# Patient Record
Sex: MALE | Race: WHITE | Employment: FULL TIME | URBAN - METROPOLITAN AREA
[De-identification: names, ages, dates, MRNs, and addresses within clinical notes are randomized per-mention and may not be internally consistent; named-entity substitution may affect disease eponyms.]

---

## 2022-02-24 NOTE — TELEPHONE ENCOUNTER
DIAGNOSIS: RIGHT foot, bone spur   APPOINTMENT DATE: 03/04/2022   NOTES STATUS DETAILS   OFFICE NOTE from referring provider Care Everywhere 09/14/2021 Dr Rodas [facility]   OFFICE NOTE from other specialist N/A    DISCHARGE SUMMARY from hospital N/A    DISCHARGE REPORT from the ER N/A    OPERATIVE REPORT N/A    EMG report N/A    MEDICATION LIST N/A    MRI N/A    DEXA (osteoporosis/bone health) N/A    CT SCAN N/A    XRAYS (IMAGES & REPORTS) Received 09/14/2021 RT ft          Action    Action Taken Sent request to [facility] for 09/14/2021 xray image into PACS. Sheryl Hermosillo on 2/24/2022 at 10:50 AM     Image in PACS.  Sheryl Hemrosillo on 2/25/2022 at 10:31 AM

## 2022-03-04 ENCOUNTER — PRE VISIT (OUTPATIENT)
Dept: PODIATRY | Facility: CLINIC | Age: 52
End: 2022-03-04

## 2022-03-04 ENCOUNTER — OFFICE VISIT (OUTPATIENT)
Dept: PODIATRY | Facility: CLINIC | Age: 52
End: 2022-03-04
Payer: COMMERCIAL

## 2022-03-04 DIAGNOSIS — M20.5X1 HALLUX LIMITUS OF RIGHT FOOT: Primary | ICD-10-CM

## 2022-03-04 PROCEDURE — 99202 OFFICE O/P NEW SF 15 MIN: CPT | Performed by: PODIATRIST

## 2022-03-04 NOTE — LETTER
3/4/2022         RE: Kristian Duncan  [address]  [place] [address]        Dear Colleague,    Thank you for referring your patient, Kristian Duncan, to the [facility]. Please see a copy of my visit note below.    No past medical history on file.  There is no problem list on file for this patient.    No past surgical history on file.  Social History     Socioeconomic History     Marital status:      Spouse name: Not on file     Number of children: Not on file     Years of education: Not on file     Highest education level: Not on file   Occupational History     Not on file   Tobacco Use     Smoking status: Not on file     Smokeless tobacco: Not on file   Substance and Sexual Activity     Alcohol use: Not on file     Drug use: Not on file     Sexual activity: Not on file   Other Topics Concern     Not on file   Social History Narrative     Not on file     Social Determinants of Health     Financial Resource Strain: Not on file   Food Insecurity: Not on file   Transportation Needs: Not on file   Physical Activity: Not on file   Stress: Not on file   Social Connections: Not on file   Intimate Partner Violence: Not on file   Housing Stability: Not on file     No family history on file.        SUBJECTIVE FINDINGS:  A 51-year-old male presents for right 1st MPJ pain.  He relates if he walks a lot or pushes off hard and he cannot do any running.  He says it hurts on the top of the 1st MPJ.  He relates he had seen a physician, Dr. Rodas, last September.  I reviewed Dr. Rodas's 09/14/2021 note.  Relates he recommended surgery but for insurance reasons,  it was better for him to come to our clinic here, so he is wondering if he can get some treatment for it.    OBJECTIVE FINDINGS:  DP and PT are 2/4, right.  He has functional hallux limitus, right foot.  He has pain on palpation of the dorsal 1st MPJ prominence.  There is no erythema, no tendon voids, no drainage, no  odor, no calor.  Noted degenerative joint changes in Dr. Rodas's note from x-rays.    ASSESSMENT AND PLAN:  Hallux limitus causing pain, right 1st MPJ.  Diagnosis and treatment options discussed with him.  I advised him on stretching.  He is wearing foot orthotics.  Advised him on Voltaren gel use.  Referral to Dr. Garcia for any surgical options given and use discussed with him.  It looks like Dr. Rodas had recommended a Watermann-type procedure.  He will follow up with me as needed.  Previous notes reviewed.          Again, thank you for allowing me to participate in the care of your patient.        Sincerely,        Denis Malave DPM

## 2022-03-04 NOTE — PROGRESS NOTES
No past medical history on file.  There is no problem list on file for this patient.    No past surgical history on file.  Social History     Socioeconomic History     Marital status:      Spouse name: Not on file     Number of children: Not on file     Years of education: Not on file     Highest education level: Not on file   Occupational History     Not on file   Tobacco Use     Smoking status: Not on file     Smokeless tobacco: Not on file   Substance and Sexual Activity     Alcohol use: Not on file     Drug use: Not on file     Sexual activity: Not on file   Other Topics Concern     Not on file   Social History Narrative     Not on file     Social Determinants of Health     Financial Resource Strain: Not on file   Food Insecurity: Not on file   Transportation Needs: Not on file   Physical Activity: Not on file   Stress: Not on file   Social Connections: Not on file   Intimate Partner Violence: Not on file   Housing Stability: Not on file     No family history on file.        SUBJECTIVE FINDINGS:  A 51-year-old male presents for right 1st MPJ pain.  He relates if he walks a lot or pushes off hard and he cannot do any running.  He says it hurts on the top of the 1st MPJ.  He relates he had seen a physician, Dr. Rodas, last September.  I reviewed Dr. Rodas's 09/14/2021 note.  Relates he recommended surgery but for insurance reasons,  it was better for him to come to our clinic here, so he is wondering if he can get some treatment for it.    OBJECTIVE FINDINGS:  DP and PT are 2/4, right.  He has functional hallux limitus, right foot.  He has pain on palpation of the dorsal 1st MPJ prominence.  There is no erythema, no tendon voids, no drainage, no odor, no calor.  Noted degenerative joint changes in Dr. Rodas's note from x-rays.    ASSESSMENT AND PLAN:  Hallux limitus causing pain, right 1st MPJ.  Diagnosis and treatment options discussed with him.  I advised him on stretching.  He is wearing foot  orthotics.  Advised him on Voltaren gel use.  Referral to Dr. Garcia for any surgical options given and use discussed with him.  It looks like Dr. Rodas had recommended a Watermann-type procedure.  He will follow up with me as needed.  Previous notes reviewed.

## 2022-03-04 NOTE — NURSING NOTE
Kristian Duncan's chief complaint for this visit includes:  Chief Complaint   Patient presents with     Consult For     Right foot bone spur     PCP: Blu Dominguez    Referring Provider:  No referring provider defined for this encounter.    There were no vitals taken for this visit.  Data Unavailable      No Known Allergies      Do you need any medication refills at today's visit?

## 2022-03-04 NOTE — PATIENT INSTRUCTIONS
Thanks for coming today.  [facility]tho[facility]  [address] [place] [address]    To schedule future appointments in Ortho Clinic, you may call [phone].    To schedule ordered imaging or an injection ordered by your provider:  Call Central Imaging Injection scheduling line: [phone]    MyChart available online at:  [email or web address]    Please call if any further questions or concerns ([phone]).  Clinic hours 8 am to 5 pm.    Return to clinic (call) if symptoms worsen or fail to improve.

## 2022-03-09 ENCOUNTER — OFFICE VISIT (OUTPATIENT)
Dept: PODIATRY | Facility: CLINIC | Age: 52
End: 2022-03-09
Attending: PODIATRIST
Payer: COMMERCIAL

## 2022-03-09 ENCOUNTER — ANCILLARY PROCEDURE (OUTPATIENT)
Dept: GENERAL RADIOLOGY | Facility: CLINIC | Age: 52
End: 2022-03-09
Attending: PODIATRIST
Payer: COMMERCIAL

## 2022-03-09 DIAGNOSIS — M20.5X1 HALLUX LIMITUS OF RIGHT FOOT: ICD-10-CM

## 2022-03-09 DIAGNOSIS — M20.5X1 HALLUX LIMITUS OF RIGHT FOOT: Primary | ICD-10-CM

## 2022-03-09 PROBLEM — G47.33 OSA (OBSTRUCTIVE SLEEP APNEA): Status: ACTIVE | Noted: 2018-03-22

## 2022-03-09 PROCEDURE — 99214 OFFICE O/P EST MOD 30 MIN: CPT | Performed by: PODIATRIST

## 2022-03-09 PROCEDURE — 73630 X-RAY EXAM OF FOOT: CPT | Mod: RT | Performed by: RADIOLOGY

## 2022-03-09 RX ORDER — ASPIRIN 81 MG/1
TABLET, CHEWABLE ORAL
COMMUNITY

## 2022-03-09 RX ORDER — ATORVASTATIN CALCIUM 10 MG/1
TABLET, FILM COATED ORAL
COMMUNITY
Start: 2022-02-19

## 2022-03-09 NOTE — PROGRESS NOTES
Pt is seen today in consult from Dr. Malave with the c/c of painful right foot.  This has been symptomatic for the past several years.  Points to dorsum of first metatarsal phalangeal joint.  Has pain w/ ambulation and shoewear and is relieved by rest.  Any activity causes severe dorsiflexion of his hallux bother this.  He has stopped doing these.  He is wearing good stiff shoes.  Denies pain in the contralateral foot.  Has tried NSAIDS,  and changing shoewear around w/o much success.  Denies any history of trauma.  History of type 2 diabetes mellitus.  Positive family history.  Denies numbness in toes.    ROS:  A ten point review of systems was performed and negative except where indicated above.          No Known Allergies    Current Outpatient Medications   Medication Sig Dispense Refill     aspirin (ASA) 81 MG chewable tablet        atorvastatin (LIPITOR) 10 MG tablet        metFORMIN (GLUCOPHAGE) 1000 MG tablet          Patient Active Problem List   Diagnosis     DM w/o complication type II (H)     Dyslipidemia     Elevated CK     Obesity     MAXIMILIAN (obstructive sleep apnea)       No past medical history on file.    No past surgical history on file.    No family history on file.    Social History     Tobacco Use     Smoking status: Not on file     Smokeless tobacco: Not on file   Substance Use Topics     Alcohol use: Not on file         EXAM:    Vitals: There were no vitals taken for this visit.  BMI: There is no height or weight on file to calculate BMI.  Height: Data Unavailable    Constitutional/ general:  Pt is in no apparent distress, appears well-nourished.  Cooperative with history and physical exam.     Psych:  The patient answered questions appropriately.  Normal affect.  Seems to have reasonable expectations, in terms of treatment.     Eyes:  Visual scanning/ tracking without deficit.     Ears:  Response to auditory stimuli is normal.  No  hearing aid devices.  Auricles in proper alignment.      Lymphatic:  Popliteal lymph nodes not enlarged.     Lungs:  Non labored breathing, non labored speech. No cough.  No audible wheezing. Even, quiet breathing.       Vascular:  Pedal pulses are palpable bilaterally for both the DP and PT arteries.  CFT < 3 sec.  No edema.  Pedal hair growth noted.     Neuro:  Alert and oriented x 3. Coordinated gait.  Light touch sensation is intact to the L4, L5, S1 distributions. No obvious deficits.  No evidence of neurological-based weakness, spasticity, or contracture in the lower extremities.     Derm: Normal texture and turgor.  No erythema, ecchymosis, or cyanosis.  No open lesions.     Musculoskeletal:No forefoot or rear foot deformities noted.  MS 5/5 all compartments.    No equinus.   Somewhat pronated right foot.  Right foot has increased internal tibial torsion compared to the left.  Normal ROM all forefoot and rearfoot joints except [id number] MTPJ.  Patient has pain with range of motion.  Rg proliferation dorsally.  No echymosis, edema, signs of infection or trauma.          Radiographic Exam:   X-ray three views foot shows joint space narrowing of 1st mtpj, subcondral schlerosis, and a dorsal flag with joint mice.  No other fractures/pathology noted.     Assessment:  Hallux Limitus right foot    Plan:  X-rays taken today.  Discussed etiology and treatment options in detail w/ the pt.  Xrays reviewed with patient.  Recommended wearing a stiff soled shoe, icing, limiting activities, not going barefoot, and taking ibuprofen prn for discomfort.  Patient will stop ibuprofen if any gi distress occurs.  Also discussed surgical options if conservative treatment fails.  Patient would like to discuss surgery.  Discussed various options.  Discussed cheilectomy.  Risks, complications, and efficacy discussed.  Discussed cheilectomy effective approximately two thirds of the time.  Patient understands could get an infection and will not have normal ROM afterwards.  Understands  that will still have arthritis in this joint and it is possible that this could still bother her.  Patient understands this is a staged procedure and if pain continues neck step is fusion.  Patient will discuss with family work and will call him when he is ready to set this up.  Thank you for allowing me participate in the care of this patient.        Saji Garcia, JUNIOR, FACFAS

## 2022-03-09 NOTE — LETTER
3/9/2022         RE: Kristian Duncan  [address]  [place] [address]        Dear Colleague,    Thank you for referring your patient, Kristian Duncan, to the [facility]. Please see a copy of my visit note below.    Pt is seen today in consult from Dr. Malave with the c/c of painful right foot.  This has been symptomatic for the past several years.  Points to dorsum of first metatarsal phalangeal joint.  Has pain w/ ambulation and shoewear and is relieved by rest.  Any activity causes severe dorsiflexion of his hallux bother this.  He has stopped doing these.  He is wearing good stiff shoes.  Denies pain in the contralateral foot.  Has tried NSAIDS,  and changing shoewear around w/o much success.  Denies any history of trauma.  History of type 2 diabetes mellitus.  Positive family history.  Denies numbness in toes.    ROS:  A ten point review of systems was performed and negative except where indicated above.          No Known Allergies    Current Outpatient Medications   Medication Sig Dispense Refill     aspirin (ASA) 81 MG chewable tablet        atorvastatin (LIPITOR) 10 MG tablet        metFORMIN (GLUCOPHAGE) 1000 MG tablet          Patient Active Problem List   Diagnosis     DM w/o complication type II (H)     Dyslipidemia     Elevated CK     Obesity     MAXIMILIAN (obstructive sleep apnea)       No past medical history on file.    No past surgical history on file.    No family history on file.    Social History     Tobacco Use     Smoking status: Not on file     Smokeless tobacco: Not on file   Substance Use Topics     Alcohol use: Not on file         EXAM:    Vitals: There were no vitals taken for this visit.  BMI: There is no height or weight on file to calculate BMI.  Height: Data Unavailable    Constitutional/ general:  Pt is in no apparent distress, appears well-nourished.  Cooperative with history and physical exam.     Psych:  The patient answered questions  appropriately.  Normal affect.  Seems to have reasonable expectations, in terms of treatment.     Eyes:  Visual scanning/ tracking without deficit.     Ears:  Response to auditory stimuli is normal.  No  hearing aid devices.  Auricles in proper alignment.     Lymphatic:  Popliteal lymph nodes not enlarged.     Lungs:  Non labored breathing, non labored speech. No cough.  No audible wheezing. Even, quiet breathing.       Vascular:  Pedal pulses are palpable bilaterally for both the DP and PT arteries.  CFT < 3 sec.  No edema.  Pedal hair growth noted.     Neuro:  Alert and oriented x 3. Coordinated gait.  Light touch sensation is intact to the L4, L5, S1 distributions. No obvious deficits.  No evidence of neurological-based weakness, spasticity, or contracture in the lower extremities.     Derm: Normal texture and turgor.  No erythema, ecchymosis, or cyanosis.  No open lesions.     Musculoskeletal:No forefoot or rear foot deformities noted.  MS 5/5 all compartments.    No equinus.   Somewhat pronated right foot.  Right foot has increased internal tibial torsion compared to the left.  Normal ROM all forefoot and rearfoot joints except [id number] MTPJ.  Patient has pain with range of motion.  Rg proliferation dorsally.  No echymosis, edema, signs of infection or trauma.          Radiographic Exam:   X-ray three views foot shows joint space narrowing of 1st mtpj, subcondral schlerosis, and a dorsal flag with joint mice.  No other fractures/pathology noted.     Assessment:  Hallux Limitus right foot    Plan:  X-rays taken today.  Discussed etiology and treatment options in detail w/ the pt.  Xrays reviewed with patient.  Recommended wearing a stiff soled shoe, icing, limiting activities, not going barefoot, and taking ibuprofen prn for discomfort.  Patient will stop ibuprofen if any gi distress occurs.  Also discussed surgical options if conservative treatment fails.  Patient would like to discuss surgery.  Discussed  various options.  Discussed cheilectomy.  Risks, complications, and efficacy discussed.  Discussed cheilectomy effective approximately two thirds of the time.  Patient understands could get an infection and will not have normal ROM afterwards.  Understands that will still have arthritis in this joint and it is possible that this could still bother her.  Patient understands this is a staged procedure and if pain continues neck step is fusion.  Patient will discuss with family work and will call him when he is ready to set this up.  Thank you for allowing me participate in the care of this patient.        Saji Garcia DPM, FACFAS            Again, thank you for allowing me to participate in the care of your patient.        Sincerely,        Saji Garcia DPM

## 2022-03-09 NOTE — PATIENT INSTRUCTIONS
We wish you continued good healing. If you have any questions or concerns, please do not hesitate to contact us at  [phone]    [employer] (secure e-mail communication and access to your chart) to send a message or to make an appointment.    Please remember to call and schedule a follow up appointment if one was recommended at your earliest convenience.     PODIATRY CLINIC HOURS  TELEPHONE NUMBER    Dr. Saji CARBAJALPLEROY [facility]        Clinics:  [facility] Garcia CMA   Tuesday 1PM-6PM  [place]  Wednesday 745AM-330PM  [place]  Thursday/Friday 745AM-230PM  [place] [facility] APPOINTMENTS  [phone]    [place] APPOINTMENTS  [phone]          If you need a medication refill, please contact us you may need lab work and/or a follow up visit prior to your refill (i.e. Antifungal medications).    If MRI needed please call Imaging at [phone] or [phone]    HOW DO I GET MY KNEE SCOOTER? Knee scooters can be picked up at ANY Medical Supply stores with your knee scooter Prescription.  OR    Bring your signed prescription to an [facility] showroom.

## 2022-03-18 ENCOUNTER — TELEPHONE (OUTPATIENT)
Dept: PODIATRY | Facility: CLINIC | Age: 52
End: 2022-03-18
Payer: COMMERCIAL

## 2022-03-18 NOTE — TELEPHONE ENCOUNTER
Reason for Call:  Other call back    Detailed comments: Patient is calling to schedule surgery orders are needed. Thank you     Phone Number Patient can be reached at: Home number on file [phone] (home)    Best Time: any    Can we leave a detailed message on this number? YES    Call taken on 3/18/2022 at 3:00 PM by Kyra Mtz

## 2022-03-22 NOTE — TELEPHONE ENCOUNTER
Pt is looking to have surgery in next three weeks or so. Fwd to MD Larissa MOORE, RN Specialty Triage 3/22/2022 4:01 PM

## 2022-03-23 ENCOUNTER — TELEPHONE (OUTPATIENT)
Dept: PODIATRY | Facility: CLINIC | Age: 52
End: 2022-03-23
Payer: COMMERCIAL

## 2022-03-23 DIAGNOSIS — Z11.59 ENCOUNTER FOR SCREENING FOR OTHER VIRAL DISEASES: Primary | ICD-10-CM

## 2022-03-23 PROBLEM — M20.5X1 HALLUX LIMITUS OF RIGHT FOOT: Status: ACTIVE | Noted: 2022-03-23

## 2022-03-23 NOTE — TELEPHONE ENCOUNTER
Type of surgery: Right cheilectomy and synovectomy right     CPT 07878, 01632   Hallux limitus of right foot M20.5X1    Location of surgery: [facility]  Date and time of surgery: 04/05/2022  Surgeon: Radha  Pre-Op Appt Date: pt will schedule with Dr Lobo Jane / forms mailed  Post-Op Appt Date: 04/11/2022  Packet sent out: Yes  Pre-cert/Authorization completed:    Call to [facility], I spoke rep and no prior auth needed, ref # [id number]  Date: 03/23/2022    Thank you,   Isabell Meneses   Prior Authorization [facility]  [phone]

## 2022-03-26 ENCOUNTER — HEALTH MAINTENANCE LETTER (OUTPATIENT)
Age: 52
End: 2022-03-26

## 2022-04-01 ENCOUNTER — LAB (OUTPATIENT)
Dept: LAB | Facility: CLINIC | Age: 52
End: 2022-04-01
Payer: COMMERCIAL

## 2022-04-01 DIAGNOSIS — Z11.59 ENCOUNTER FOR SCREENING FOR OTHER VIRAL DISEASES: ICD-10-CM

## 2022-04-01 PROCEDURE — U0005 INFEC AGEN DETEC AMPLI PROBE: HCPCS

## 2022-04-01 PROCEDURE — U0003 INFECTIOUS AGENT DETECTION BY NUCLEIC ACID (DNA OR RNA); SEVERE ACUTE RESPIRATORY SYNDROME CORONAVIRUS 2 (SARS-COV-2) (CORONAVIRUS DISEASE [COVID-19]), AMPLIFIED PROBE TECHNIQUE, MAKING USE OF HIGH THROUGHPUT TECHNOLOGIES AS DESCRIBED BY CMS-2020-01-R: HCPCS

## 2022-04-02 LAB — SARS-COV-2 RNA RESP QL NAA+PROBE: NEGATIVE

## 2022-04-04 ENCOUNTER — ANESTHESIA EVENT (OUTPATIENT)
Dept: SURGERY | Facility: AMBULATORY SURGERY CENTER | Age: 52
End: 2022-04-04
Payer: COMMERCIAL

## 2022-04-05 ENCOUNTER — HOSPITAL ENCOUNTER (OUTPATIENT)
Facility: AMBULATORY SURGERY CENTER | Age: 52
Discharge: HOME | End: 2022-04-05
Attending: PODIATRIST | Admitting: PODIATRIST
Payer: COMMERCIAL

## 2022-04-05 ENCOUNTER — ANESTHESIA (OUTPATIENT)
Dept: SURGERY | Facility: AMBULATORY SURGERY CENTER | Age: 52
End: 2022-04-05
Payer: COMMERCIAL

## 2022-04-05 VITALS
DIASTOLIC BLOOD PRESSURE: 76 MMHG | TEMPERATURE: 97.9 F | WEIGHT: 260 LBS | SYSTOLIC BLOOD PRESSURE: 131 MMHG | HEIGHT: 77 IN | RESPIRATION RATE: 16 BRPM | BODY MASS INDEX: 30.7 KG/M2 | OXYGEN SATURATION: 95 %

## 2022-04-05 DIAGNOSIS — M20.5X1 HALLUX LIMITUS OF RIGHT FOOT: ICD-10-CM

## 2022-04-05 LAB — GLUCOSE BLDC GLUCOMTR-MCNC: 178 MG/DL (ref 70–99)

## 2022-04-05 PROCEDURE — 82962 GLUCOSE BLOOD TEST: CPT | Performed by: PODIATRIST

## 2022-04-05 PROCEDURE — G8907 PT DOC NO EVENTS ON DISCHARG: HCPCS

## 2022-04-05 PROCEDURE — G8916 PT W IV AB GIVEN ON TIME: HCPCS

## 2022-04-05 PROCEDURE — 28289 CORRJ HALUX RIGDUS W/O IMPLT: CPT | Mod: RT | Performed by: PODIATRIST

## 2022-04-05 PROCEDURE — 28289 CORRJ HALUX RIGDUS W/O IMPLT: CPT | Mod: T5

## 2022-04-05 RX ORDER — DEXAMETHASONE SODIUM PHOSPHATE 4 MG/ML
INJECTION, SOLUTION INTRA-ARTICULAR; INTRALESIONAL; INTRAMUSCULAR; INTRAVENOUS; SOFT TISSUE PRN
Status: DISCONTINUED | OUTPATIENT
Start: 2022-04-05 | End: 2022-04-05

## 2022-04-05 RX ORDER — PROPOFOL 10 MG/ML
INJECTION, EMULSION INTRAVENOUS PRN
Status: DISCONTINUED | OUTPATIENT
Start: 2022-04-05 | End: 2022-04-05

## 2022-04-05 RX ORDER — CEFAZOLIN SODIUM 2 G/100ML
2 INJECTION, SOLUTION INTRAVENOUS
Status: COMPLETED | OUTPATIENT
Start: 2022-04-05 | End: 2022-04-05

## 2022-04-05 RX ORDER — LIDOCAINE HYDROCHLORIDE 20 MG/ML
INJECTION, SOLUTION INFILTRATION; PERINEURAL PRN
Status: DISCONTINUED | OUTPATIENT
Start: 2022-04-05 | End: 2022-04-05

## 2022-04-05 RX ORDER — HYDROCODONE BITARTRATE AND ACETAMINOPHEN 5; 325 MG/1; MG/1
1-2 TABLET ORAL EVERY 4 HOURS PRN
Qty: 25 TABLET | Refills: 0 | Status: SHIPPED | OUTPATIENT
Start: 2022-04-05

## 2022-04-05 RX ORDER — LABETALOL HYDROCHLORIDE 5 MG/ML
10 INJECTION, SOLUTION INTRAVENOUS
Status: DISCONTINUED | OUTPATIENT
Start: 2022-04-05 | End: 2022-04-06 | Stop reason: HOSPADM

## 2022-04-05 RX ORDER — KETOROLAC TROMETHAMINE 30 MG/ML
INJECTION, SOLUTION INTRAMUSCULAR; INTRAVENOUS PRN
Status: DISCONTINUED | OUTPATIENT
Start: 2022-04-05 | End: 2022-04-05

## 2022-04-05 RX ORDER — ACETAMINOPHEN 325 MG/1
975 TABLET ORAL ONCE
Status: COMPLETED | OUTPATIENT
Start: 2022-04-05 | End: 2022-04-05

## 2022-04-05 RX ORDER — BUPIVACAINE HYDROCHLORIDE 5 MG/ML
INJECTION, SOLUTION PERINEURAL PRN
Status: DISCONTINUED | OUTPATIENT
Start: 2022-04-05 | End: 2022-04-05 | Stop reason: HOSPADM

## 2022-04-05 RX ORDER — OXYCODONE HYDROCHLORIDE 5 MG/1
5 TABLET ORAL EVERY 4 HOURS PRN
Status: DISCONTINUED | OUTPATIENT
Start: 2022-04-05 | End: 2022-04-06 | Stop reason: HOSPADM

## 2022-04-05 RX ORDER — LIDOCAINE 40 MG/G
CREAM TOPICAL
Status: DISCONTINUED | OUTPATIENT
Start: 2022-04-05 | End: 2022-04-06 | Stop reason: HOSPADM

## 2022-04-05 RX ORDER — FENTANYL CITRATE 50 UG/ML
25 INJECTION, SOLUTION INTRAMUSCULAR; INTRAVENOUS
Status: DISCONTINUED | OUTPATIENT
Start: 2022-04-05 | End: 2022-04-06 | Stop reason: HOSPADM

## 2022-04-05 RX ORDER — FENTANYL CITRATE 50 UG/ML
INJECTION, SOLUTION INTRAMUSCULAR; INTRAVENOUS PRN
Status: DISCONTINUED | OUTPATIENT
Start: 2022-04-05 | End: 2022-04-05

## 2022-04-05 RX ORDER — FENTANYL CITRATE 50 UG/ML
25 INJECTION, SOLUTION INTRAMUSCULAR; INTRAVENOUS EVERY 5 MIN PRN
Status: DISCONTINUED | OUTPATIENT
Start: 2022-04-05 | End: 2022-04-06 | Stop reason: HOSPADM

## 2022-04-05 RX ORDER — DIAZEPAM 10 MG/2ML
2.5 INJECTION, SOLUTION INTRAMUSCULAR; INTRAVENOUS
Status: DISCONTINUED | OUTPATIENT
Start: 2022-04-05 | End: 2022-04-06 | Stop reason: HOSPADM

## 2022-04-05 RX ORDER — CEFAZOLIN SODIUM 2 G/100ML
2 INJECTION, SOLUTION INTRAVENOUS SEE ADMIN INSTRUCTIONS
Status: DISCONTINUED | OUTPATIENT
Start: 2022-04-05 | End: 2022-04-06 | Stop reason: HOSPADM

## 2022-04-05 RX ORDER — ONDANSETRON 2 MG/ML
4 INJECTION INTRAMUSCULAR; INTRAVENOUS EVERY 30 MIN PRN
Status: DISCONTINUED | OUTPATIENT
Start: 2022-04-05 | End: 2022-04-06 | Stop reason: HOSPADM

## 2022-04-05 RX ORDER — ONDANSETRON 4 MG/1
4 TABLET, ORALLY DISINTEGRATING ORAL EVERY 30 MIN PRN
Status: DISCONTINUED | OUTPATIENT
Start: 2022-04-05 | End: 2022-04-06 | Stop reason: HOSPADM

## 2022-04-05 RX ORDER — SODIUM CHLORIDE, SODIUM LACTATE, POTASSIUM CHLORIDE, CALCIUM CHLORIDE 600; 310; 30; 20 MG/100ML; MG/100ML; MG/100ML; MG/100ML
INJECTION, SOLUTION INTRAVENOUS CONTINUOUS
Status: DISCONTINUED | OUTPATIENT
Start: 2022-04-05 | End: 2022-04-06 | Stop reason: HOSPADM

## 2022-04-05 RX ORDER — PROPOFOL 10 MG/ML
INJECTION, EMULSION INTRAVENOUS CONTINUOUS PRN
Status: DISCONTINUED | OUTPATIENT
Start: 2022-04-05 | End: 2022-04-05

## 2022-04-05 RX ORDER — MEPERIDINE HYDROCHLORIDE 25 MG/ML
12.5 INJECTION INTRAMUSCULAR; INTRAVENOUS; SUBCUTANEOUS
Status: DISCONTINUED | OUTPATIENT
Start: 2022-04-05 | End: 2022-04-06 | Stop reason: HOSPADM

## 2022-04-05 RX ORDER — ONDANSETRON 2 MG/ML
INJECTION INTRAMUSCULAR; INTRAVENOUS PRN
Status: DISCONTINUED | OUTPATIENT
Start: 2022-04-05 | End: 2022-04-05

## 2022-04-05 RX ORDER — ALBUTEROL SULFATE 0.83 MG/ML
2.5 SOLUTION RESPIRATORY (INHALATION) EVERY 4 HOURS PRN
Status: DISCONTINUED | OUTPATIENT
Start: 2022-04-05 | End: 2022-04-06 | Stop reason: HOSPADM

## 2022-04-05 RX ADMIN — PROPOFOL 100 MCG/KG/MIN: 10 INJECTION, EMULSION INTRAVENOUS at 09:40

## 2022-04-05 RX ADMIN — PROPOFOL 120 MG: 10 INJECTION, EMULSION INTRAVENOUS at 09:40

## 2022-04-05 RX ADMIN — SODIUM CHLORIDE, SODIUM LACTATE, POTASSIUM CHLORIDE, CALCIUM CHLORIDE: 600; 310; 30; 20 INJECTION, SOLUTION INTRAVENOUS at 09:30

## 2022-04-05 RX ADMIN — ONDANSETRON 4 MG: 2 INJECTION INTRAMUSCULAR; INTRAVENOUS at 09:58

## 2022-04-05 RX ADMIN — DEXAMETHASONE SODIUM PHOSPHATE 4 MG: 4 INJECTION, SOLUTION INTRA-ARTICULAR; INTRALESIONAL; INTRAMUSCULAR; INTRAVENOUS; SOFT TISSUE at 09:50

## 2022-04-05 RX ADMIN — LIDOCAINE HYDROCHLORIDE 60 MG: 20 INJECTION, SOLUTION INFILTRATION; PERINEURAL at 09:39

## 2022-04-05 RX ADMIN — KETOROLAC TROMETHAMINE 30 MG: 30 INJECTION, SOLUTION INTRAMUSCULAR; INTRAVENOUS at 10:23

## 2022-04-05 RX ADMIN — ACETAMINOPHEN 975 MG: 325 TABLET ORAL at 07:51

## 2022-04-05 RX ADMIN — CEFAZOLIN SODIUM 2 G: 2 INJECTION, SOLUTION INTRAVENOUS at 09:34

## 2022-04-05 RX ADMIN — FENTANYL CITRATE 25 MCG: 50 INJECTION, SOLUTION INTRAMUSCULAR; INTRAVENOUS at 09:35

## 2022-04-05 NOTE — DISCHARGE INSTRUCTIONS
*You had 975 mg of Tylenol at 8 AM. You may repeat this after 2 PM. Maximum amount of Tylenol/Acetaminophen in a 24 hour period is 4,000 mg.    *You had an IV form of Ibuprofen (Motrin) at 10:30 AM. You should not take any NSAIDS/Ibuprofen products until 4:30 PM.     --------------------------------------------------------------------------------------------------------    To contact Dr Garcia call:  [phone]    --------------------------------------------------------------------------------------------------------  [facility]   Adult Discharge Orders & Instructions     For 24 hours after surgery    1. Get plenty of rest.  A responsible adult must stay with you for at least 24 hours after you leave the hospital.   2. Do not drive or use heavy equipment.  If you have weakness or tingling, don't drive or use heavy equipment until this feeling goes away.  3. Do not drink alcohol.  4. Avoid strenuous or risky activities.  Ask for help when climbing stairs.   5. You may feel lightheaded.  IF so, sit for a few minutes before standing.  Have someone help you get up.   6. If you have nausea (feel sick to your stomach): Drink only clear liquids such as apple juice, ginger ale, broth or 7-Up.  Rest may also help.  Be sure to drink enough fluids.  Move to a regular diet as you feel able.  7. You may have a slight fever. Call the doctor if your fever is over 100 F (37.7 C) (taken under the tongue) or lasts longer than 24 hours.  8. You may have a dry mouth, a sore throat, muscle aches or trouble sleeping.  These should go away after 24 hours.  9. Do not make important or legal decisions.     Call your doctor for any of the followin.  Signs of infection (fever, growing tenderness at the surgery site, a large amount of drainage or bleeding, severe pain, foul-smelling drainage, redness, swelling).    2. It has been over 8 to 10 hours since surgery and you are still not able to urinate (pass water).    3.   Headache for over 24 hours.    4.  Numbness, tingling or weakness the day after surgery (if you had spinal anesthesia).                 5. Signs of Covid-19 infection (temperature over 100 degrees, shortness of breath, cough, loss of taste/smell, generalized body aches, persistent headache, chills, sore throat, nausea/vomiting/diarrhea).

## 2022-04-05 NOTE — ANESTHESIA PREPROCEDURE EVALUATION
Anesthesia Pre-Procedure Evaluation    Patient: Kristian Duncan   MRN: [id number] : 1970        Procedure : Procedure(s):  Right cheilectomy and synovectomy          Past Medical History:   Diagnosis Date     Diabetes (H)     type 2     Sleep apnea       Past Surgical History:   Procedure Laterality Date     COLONOSCOPY        No Known Allergies   Social History     Tobacco Use     Smoking status: Never Smoker     Smokeless tobacco: Former User   Substance Use Topics     Alcohol use: Never      Wt Readings from Last 1 Encounters:   22 117.9 kg (260 lb)        Anesthesia Evaluation   Pt has had prior anesthetic.     No history of anesthetic complications       ROS/MED HX  ENT/Pulmonary:     (+) sleep apnea, uses CPAP,     Neurologic:  - neg neurologic ROS     Cardiovascular:     (+) Dyslipidemia ----- (-) murmur   METS/Exercise Tolerance: >4 METS    Hematologic:       Musculoskeletal:       GI/Hepatic:  - neg GI/hepatic ROS     Renal/Genitourinary:  - neg Renal ROS     Endo:     (+) type II DM, Not using insulin, Obesity,     Psychiatric/Substance Use:  - neg psychiatric ROS     Infectious Disease:  - neg infectious disease ROS     Malignancy:  - neg malignancy ROS     Other:  - neg other ROS          Physical Exam    Airway        Mallampati: I   TM distance: > 3 FB   Neck ROM: full   Mouth opening: > 3 cm    Respiratory Devices and Support         Dental  no notable dental history         Cardiovascular          Rhythm and rate: regular and normal (-) no murmur    Pulmonary   pulmonary exam normal        breath sounds clear to auscultation           OUTSIDE LABS:  CBC: No results found for: WBC, HGB, HCT, PLT  BMP: No results found for: NA, POTASSIUM, CHLORIDE, CO2, BUN, CR, GLC  COAGS: No results found for: PTT, INR, FIBR  POC: No results found for: BGM, HCG, HCGS  HEPATIC: No results found for: ALBUMIN, PROTTOTAL, ALT, AST, GGT, ALKPHOS, BILITOTAL, BILIDIRECT, SHERIN  OTHER: No results found for:  PH, LACT, A1C, MINERVA, PHOS, MAG, LIPASE, AMYLASE, TSH, T4, T3, CRP, SED    Anesthesia Plan    ASA Status:  2   NPO Status:  NPO Appropriate    Anesthesia Type: MAC.     - Reason for MAC: immobility needed   Induction: Intravenous, Propofol.   Maintenance: TIVA.        Consents    Anesthesia Plan(s) and associated risks, benefits, and realistic alternatives discussed. Questions answered and patient/representative(s) expressed understanding.    - Discussed:     - Discussed with:  Patient      - Specific Concerns: conversion to general anethetic with airway management (higher risk due to MAXIMILIAN), potential for recall, post op pain/nausea, rare major complications.     - Extended Intubation/Ventilatory Support Discussed: No.      - Patient is DNR/DNI Status: No    Use of blood products discussed: No .     Postoperative Care    Pain management: IV analgesics.   PONV prophylaxis: Ondansetron (or other 5HT-3), Background Propofol Infusion     Comments:           H&P reviewed: Unable to attach H&P to encounter due to EHR limitations. H&P Update: appropriate H&P reviewed, patient examined. No interval changes since H&P (within 30 days).         Mitchell Mendoza MD

## 2022-04-05 NOTE — OP NOTE
Procedure Date: 04/05/2022    PREOPERATIVE DIAGNOSES:     1.  Right hallux limitus.  2.  Right first metatarsophalangeal joint synovitis.    POSTOPERATIVE DIAGNOSES:     1.  Right hallux limitus.  2.  Right first metatarsophalangeal joint synovitis.    PROCEDURE PERFORMED:     1.  Right cheilectomy.  2.  Right first metatarsophalangeal joint synovectomy.    INDICATIONS FOR PROCEDURE:  This patient has arthritis of this joint and elects to have a cheilectomy.  He understands this is a staged procedure.  If pain continues, the next step would be fusion of this joint.    DESCRIPTION OF PROCEDURE:  The patient was brought to the operating table and laid in a supine position.  He was given sedation by the anesthesiologist and a right first Wolfe block was done.  Foot was then prepped and draped in normal sterile manner.  Pneumatic ankle tourniquet was placed around the ankle with copious amounts of Webril padding.  Foot was then elevated for complete exsanguination.  The tourniquet inflated.  The foot was brought onto the operating table where the following procedure was performed.  At this time, an incision was made just medial to the extensor hallucis longus tendon.  This brought down the deep fascia utilizing blunt and sharp dissection techniques.  All neurovascular structures that were encountered were either bovied, tied, or retracted to the side.  Periosteum was incised the entire length of the incision and reflected off medially and dorsally.  We immediately noted significant hypertrophic bone dorsum of first metatarsal head.  There were loose bodies in the joint.  These were removed.   All the hypertrophic bone was removed off the right first metatarsal head dorsally, dorsomedial and dorsolaterally.  All sharp bony prominences were burred smooth.  We evaluated the cartilage.  It was thin but intact medially.  The lateral half was mostly devoid of cartilage.  Any bone prominences removed off the base dorsally of  the proximal phalanx.  We flushed the wound.  Hypertrophic synovium was noted.  This was all reflected at this time.  We then went through range of motion.  It was smooth.  No clicking.  Standard layered closure was done at this time.  We dressed this with Adaptic, 4 x 4s, Lucho, Kerlix and Coban.  The tourniquet was deflated.  All digits were noted to show preoperative levels of perfusion.    COMPLICATIONS:  None.    ESTIMATED BLOOD LOSS:  1 mL.     The patient tolerated the procedure and anesthesia well.  He was then wheeled from the operating room to the recovery room with vital signs stable and an intact sterile dressing.    Saji Garcia DPM        D: 2022   T: 2022   MT: FLASH    Name:     ALIDA ZAPATA  MRN:      [id number]        Account:        [id number]   :      1970           Procedure Date: 2022     Document: [id number]

## 2022-04-05 NOTE — ANESTHESIA POSTPROCEDURE EVALUATION
Patient: Kristian Duncan    Procedure: Procedure(s):  Right cheilectomy and synovectomy       Anesthesia Type:  MAC    Note:  Disposition: Outpatient   Postop Pain Control: Uneventful            Sign Out: Well controlled pain   PONV: No   Neuro/Psych: Uneventful            Sign Out: Acceptable/Baseline neuro status   Airway/Respiratory: Uneventful            Sign Out: Acceptable/Baseline resp. status   CV/Hemodynamics: Uneventful            Sign Out: Acceptable CV status; No obvious hypovolemia; No obvious fluid overload   Other NRE: NONE   DID A NON-ROUTINE EVENT OCCUR? No           Last vitals:  Vitals Value Taken Time   /76 04/05/22 1100   Temp 97.9  F (36.6  C) 04/05/22 1040   Pulse     Resp 16 04/05/22 1100   SpO2 95 % 04/05/22 1100       Electronically Signed By: Mitchell Mendoza MD  April 5, 2022  1:25 PM

## 2022-04-05 NOTE — BRIEF OP NOTE
[facility] Brief Operative Note    Pre-operative diagnosis: Hallux limitus of right foot [M20.5X1]   Post-operative diagnosis Right hallux limitus  Right first mtpj synovitis   Procedure: Procedure(s):  Right cheilectomy and synovectomy   Surgeon(s): Surgeon(s) and Role:     * Saji Garcia DPM - Primary   Estimated blood loss: 1 mL    Specimens: * No specimens in log *   Findings: arthritis

## 2022-04-05 NOTE — ANESTHESIA CARE TRANSFER NOTE
Patient: Kristian Duncan    Procedure: Procedure(s):  Right cheilectomy and synovectomy       Diagnosis: Hallux limitus of right foot [M20.5X1]  Diagnosis Additional Information: No value filed.    Anesthesia Type:   MAC     Note:    Oropharynx: oropharynx clear of all foreign objects  Level of Consciousness: awake  Oxygen Supplementation: room air    Independent Airway: airway patency satisfactory and stable  Dentition: dentition unchanged  Vital Signs Stable: post-procedure vital signs reviewed and stable  Report to RN Given: handoff report given  Patient transferred to: Phase II  Comments: Patient awake, alert, and oriented. SpO2 98%.  No apparent anesthesia complications.   Handoff Report: Identifed the Patient, Identified the Reponsible Provider, Reviewed the pertinent medical history, Discussed the surgical course, Reviewed Intra-OP anesthesia mangement and issues during anesthesia, Set expectations for post-procedure period and Allowed opportunity for questions and acknowledgement of understanding      Vitals:  Vitals Value Taken Time   BP     Temp     Pulse     Resp     SpO2         Electronically Signed By: MALIKA Gomez CRNA  April 5, 2022  10:39 AM

## 2022-04-11 ENCOUNTER — OFFICE VISIT (OUTPATIENT)
Dept: PODIATRY | Facility: CLINIC | Age: 52
End: 2022-04-11

## 2022-04-11 VITALS — HEART RATE: 107 BPM | OXYGEN SATURATION: 96 % | SYSTOLIC BLOOD PRESSURE: 144 MMHG | DIASTOLIC BLOOD PRESSURE: 90 MMHG

## 2022-04-11 DIAGNOSIS — M20.5X1 HALLUX LIMITUS OF RIGHT FOOT: Primary | ICD-10-CM

## 2022-04-11 PROCEDURE — 99024 POSTOP FOLLOW-UP VISIT: CPT | Performed by: PODIATRIST

## 2022-04-11 NOTE — LETTER
4/11/2022         RE: Kristian Duncan  [address]  [place] [address]        Dear Colleague,    Thank you for referring your patient, Kristian Duncan, to the [facility]. Please see a copy of my visit note below.    Subjective:    Patient is 6 days postopp right cheilectomy/synovectomy done on 4/5/22.  Patient is doing well, admits compliance, denies fevers, chills, nausea or vomiting.  Pain slowly getting better.    Objective:    Dressings clean, dry and intact.  Incisions are dry, well coapted with no dehiscence or drainage.  Pulses are palpable, sensation to light touch is intact.  Normal postopp edema.  No echymosis on the opperative site.  No erythema.    Hallux in good alignment.      Assessment: Postopp day 6 right cheilectomy/synovectomy    Plan:   New dressing placed on foot.  Continue ACE bandage and elevation.  Start gentle range of motion of first MTPJ.  Any increase in erythema, edema, and pain patient to call me immediately.  RETURN TO CLINIC in 9 days for suture removal.    Saji Garcia DPM, FACFAS            Again, thank you for allowing me to participate in the care of your patient.        Sincerely,        Saji Garcia DPM

## 2022-04-20 ENCOUNTER — OFFICE VISIT (OUTPATIENT)
Dept: PODIATRY | Facility: CLINIC | Age: 52
End: 2022-04-20
Payer: COMMERCIAL

## 2022-04-20 VITALS — BODY MASS INDEX: 30.83 KG/M2 | WEIGHT: 260 LBS

## 2022-04-20 DIAGNOSIS — M20.5X1 HALLUX LIMITUS OF RIGHT FOOT: Primary | ICD-10-CM

## 2022-04-20 PROCEDURE — 99024 POSTOP FOLLOW-UP VISIT: CPT | Performed by: PODIATRIST

## 2022-04-20 NOTE — LETTER
4/20/2022         RE: Kristian Duncan  [address]  [place] [address]        Dear Colleague,    Thank you for referring your patient, Kristian Duncan, to the [facility]. Please see a copy of my visit note below.    Subjective:    Patient is 15 days postopp right cheilectomy/synovectomy done on 4/5/22.  Patient is doing well, admits compliance, denies fevers, chills, nausea or vomiting.  Patient states virtually no pain now and feels much better than before surgery.    Objective:    Dressings clean, dry and intact.  Incisions are dry, well coapted with no dehiscence or drainage.  Pulses are palpable, sensation to light touch is intact.  Normal postopp edema, and this is very minimal.  No echymosis on the opperative site.  No erythema.    Hallux in good alignment.  Range of motion improved since before surgery.  No pain with range of motion at this time.    Assessment: Postopp right cheilectomy/synovectomy    Plan:   Sutures removed.  Only gentle walking for next 2 weeks.  May then start becoming more active on it.  Again discussed joint not normal and he should avoid hyperextension or flexion of this.  RTC as needed    Saji Garcia DPM, FACFAS            Again, thank you for allowing me to participate in the care of your patient.        Sincerely,        Saji Garcia DPM

## 2022-04-20 NOTE — PROGRESS NOTES
Subjective:    Patient is 15 days postopp right cheilectomy/synovectomy done on 4/5/22.  Patient is doing well, admits compliance, denies fevers, chills, nausea or vomiting.  Patient states virtually no pain now and feels much better than before surgery.    Objective:    Dressings clean, dry and intact.  Incisions are dry, well coapted with no dehiscence or drainage.  Pulses are palpable, sensation to light touch is intact.  Normal postopp edema, and this is very minimal.  No echymosis on the opperative site.  No erythema.    Hallux in good alignment.  Range of motion improved since before surgery.  No pain with range of motion at this time.    Assessment: Postopp right cheilectomy/synovectomy    Plan:   Sutures removed.  Only gentle walking for next 2 weeks.  May then start becoming more active on it.  Again discussed joint not normal and he should avoid hyperextension or flexion of this.  RTC as needed    Saji Garcia DPM, FACFAS

## 2022-04-20 NOTE — PATIENT INSTRUCTIONS
We wish you continued good healing. If you have any questions or concerns, please do not hesitate to contact us at  [phone]    [employer] (secure e-mail communication and access to your chart) to send a message or to make an appointment.    Please remember to call and schedule a follow up appointment if one was recommended at your earliest convenience.     PODIATRY CLINIC HOURS  TELEPHONE NUMBER    Dr. Saji CARBAJALPLEROY [facility]        Clinics:  Martin Garcia CMA   Tuesday 1PM-6PM  [place]  Wednesday 745AM-330PM  [place]  Thursday/Friday 745AM-230PM  [place] [facility] APPOINTMENTS  [phone]    [place] APPOINTMENTS  [phone]        If you need a medication refill, please contact us you may need lab work and/or a follow up visit prior to your refill (i.e. Antifungal medications).  If MRI needed please call Imaging at [phone] or [phone]  HOW DO I GET MY KNEE SCOOTER? Knee scooters can be picked up at ANY Medical Supply stores with your knee scooter Prescription.  OR  Bring your signed prescription to an [facility] Medical Equipment showroom.

## 2022-07-16 ENCOUNTER — HEALTH MAINTENANCE LETTER (OUTPATIENT)
Age: 52
End: 2022-07-16

## 2022-09-17 ENCOUNTER — HEALTH MAINTENANCE LETTER (OUTPATIENT)
Age: 52
End: 2022-09-17

## 2023-01-23 ENCOUNTER — HEALTH MAINTENANCE LETTER (OUTPATIENT)
Age: 53
End: 2023-01-23

## 2023-03-18 NOTE — PATIENT INSTRUCTIONS
We wish you continued good healing. If you have any questions or concerns, please do not hesitate to contact us at  [phone]    [facility] [employer] (secure e-mail communication and access to your chart) to send a message or to make an appointment.    Please remember to call and schedule a follow up appointment if one was recommended at your earliest convenience.     PODIATRY CLINIC HOURS  TELEPHONE NUMBER    Dr. Saji CARBAJALPLEROY [facility]        Clinics:  Martin Garcia CMA   Tuesday 1PM-6PM  [place]  Wednesday 745AM-330PM  [place]  Thursday/Friday 745AM-230PM  [place] [facility] APPOINTMENTS  [phone]    [place] APPOINTMENTS  [phone]        If you need a medication refill, please contact us you may need lab work and/or a follow up visit prior to your refill (i.e. Antifungal medications).  If MRI needed please call Imaging at [phone] or [phone]  HOW DO I GET MY KNEE SCOOTER? Knee scooters can be picked up at ANY Medical Supply stores with your knee scooter Prescription.  OR  Bring your signed prescription to an [facility] Medical Equipment showroom.             No

## 2023-05-06 ENCOUNTER — HEALTH MAINTENANCE LETTER (OUTPATIENT)
Age: 53
End: 2023-05-06

## 2023-10-07 ENCOUNTER — HEALTH MAINTENANCE LETTER (OUTPATIENT)
Age: 53
End: 2023-10-07

## 2024-02-24 ENCOUNTER — HEALTH MAINTENANCE LETTER (OUTPATIENT)
Age: 54
End: 2024-02-24

## 2024-07-13 ENCOUNTER — HEALTH MAINTENANCE LETTER (OUTPATIENT)
Age: 54
End: 2024-07-13

## 2024-11-30 ENCOUNTER — HEALTH MAINTENANCE LETTER (OUTPATIENT)
Age: 54
End: 2024-11-30

## 2025-01-22 ENCOUNTER — OFFICE VISIT (OUTPATIENT)
Dept: SURGERY | Facility: CLINIC | Age: 55
End: 2025-01-22
Payer: COMMERCIAL

## 2025-01-22 VITALS
HEART RATE: 97 BPM | DIASTOLIC BLOOD PRESSURE: 79 MMHG | SYSTOLIC BLOOD PRESSURE: 134 MMHG | WEIGHT: 253 LBS | HEIGHT: 78 IN | BODY MASS INDEX: 29.27 KG/M2

## 2025-01-22 DIAGNOSIS — M62.08 DIASTASIS RECTI: Primary | ICD-10-CM

## 2025-01-22 NOTE — LETTER
1/22/2025      Kristian Duncan  [address]  [place] [address]      Dear Colleague,    Thank you for referring your patient, Kristian Duncan, to the [facility]. Please see a copy of my visit note below.    Patient seen in consultation for hernia by Blu Dominguez    HPI:  Patient is a 54 year old male  with complaints of bulge above umbilicus  Not painful. Sees when sitting up  The patient noticed the symptoms about 6 months ago.    nothing makes the episode better.  Patient has not family history of hernia problems    Review Of Systems    Skin: negative  Ears/Nose/Throat: negative  Respiratory: No shortness of breath, dyspnea on exertion, cough, or hemoptysis  Cardiovascular: negative  Gastrointestinal: negative  Genitourinary: negative  Musculoskeletal: negative  Neurologic: negative  Hematologic/Lymphatic/Immunologic: negative  Endocrine: negative      Past Medical History:   Diagnosis Date     Diabetes (H)     type 2     Sleep apnea        Past Surgical History:   Procedure Laterality Date     CHEILECTOMY Right 4/5/2022    Procedure: Right cheilectomy and synovectomy;  Surgeon: Saji Garcia DPM;  Location: [facility] OR     COLONOSCOPY         Social History     Socioeconomic History     Marital status:      Spouse name: Not on file     Number of children: Not on file     Years of education: Not on file     Highest education level: Not on file   Occupational History     Not on file   Tobacco Use     Smoking status: Never     Smokeless tobacco: Former     Quit date: 1989   Substance and Sexual Activity     Alcohol use: Never     Drug use: Never     Sexual activity: Not on file   Other Topics Concern     Not on file   Social History Narrative     Not on file     Social Drivers of Health     Financial Resource Strain: Not on file   Food Insecurity: Not on file   Transportation Needs: Not on file   Physical Activity: Not on file   Stress: Not on file   Social  "Connections: Not on file   Interpersonal Safety: Not on file   Housing Stability: Not on file       Current Outpatient Medications   Medication Sig Dispense Refill     aspirin (ASA) 81 MG chewable tablet        atorvastatin (LIPITOR) 10 MG tablet        HYDROcodone-acetaminophen (NORCO) 5-325 MG tablet Take 1-2 tablets by mouth every 4 hours as needed for moderate to severe pain 25 tablet 0     metFORMIN (GLUCOPHAGE) 1000 MG tablet          Medications and history reviewed    Physical exam:  Vitals: /79   Pulse 97   Ht 1.981 m (6' 6\")   Wt 114.8 kg (253 lb)   BMI 29.24 kg/m    BMI= Body mass index is 29.24 kg/m .    Constitutional: healthy, alert, and no distress  Head: Normocephalic. No masses, lesions, tenderness or abnormalities  Gastrointestinal: Abdomen soft, non-tender. BS normal. No masses, organomegaly.  No visible or palpable hernia.  Has a diastases rectus most noticeable when sitting up on the exam table.  About 1 finger wide.  : Deferred  Musculoskeletal: extremities normal- no gross deformities noted, gait normal, and normal muscle tone  Skin: no suspicious lesions or rashes  Patient able to get up on table without difficulty.      Assessment:     ICD-10-CM    1. Diastasis recti  M62.08         Plan: No hernia on exam, only this diastases.  Discussed differences in hernia versus diastases.  Provided the [employer] diastases informational handout which has some physical therapy type exercises that he could try on his own.  Discussed that there is high risk of forming hernia in the week area and to keep an eye on things.  Can also help to work on weight loss.  Follow-up in clinic as needed.    Abhi Ponce MD      Again, thank you for allowing me to participate in the care of your patient.        Sincerely,    Abhi Ponce MD    Electronically signed  "

## 2025-01-22 NOTE — PROGRESS NOTES
Patient seen in consultation for hernia by Blu Dominguez    HPI:  Patient is a 54 year old male  with complaints of bulge above umbilicus  Not painful. Sees when sitting up  The patient noticed the symptoms about 6 months ago.    nothing makes the episode better.  Patient has not family history of hernia problems    Review Of Systems    Skin: negative  Ears/Nose/Throat: negative  Respiratory: No shortness of breath, dyspnea on exertion, cough, or hemoptysis  Cardiovascular: negative  Gastrointestinal: negative  Genitourinary: negative  Musculoskeletal: negative  Neurologic: negative  Hematologic/Lymphatic/Immunologic: negative  Endocrine: negative      Past Medical History:   Diagnosis Date    Diabetes (H)     type 2    Sleep apnea        Past Surgical History:   Procedure Laterality Date    CHEILECTOMY Right 4/5/2022    Procedure: Right cheilectomy and synovectomy;  Surgeon: Saji Garcia DPM;  Location: [facility] OR    COLONOSCOPY         Social History     Socioeconomic History    Marital status:      Spouse name: Not on file    Number of children: Not on file    Years of education: Not on file    Highest education level: Not on file   Occupational History    Not on file   Tobacco Use    Smoking status: Never    Smokeless tobacco: Former     Quit date: 1989   Substance and Sexual Activity    Alcohol use: Never    Drug use: Never    Sexual activity: Not on file   Other Topics Concern    Not on file   Social History Narrative    Not on file     Social Drivers of Health     Financial Resource Strain: Not on file   Food Insecurity: Not on file   Transportation Needs: Not on file   Physical Activity: Not on file   Stress: Not on file   Social Connections: Not on file   Interpersonal Safety: Not on file   Housing Stability: Not on file       Current Outpatient Medications   Medication Sig Dispense Refill    aspirin (ASA) 81 MG chewable tablet       atorvastatin (LIPITOR) 10 MG tablet        "HYDROcodone-acetaminophen (NORCO) 5-325 MG tablet Take 1-2 tablets by mouth every 4 hours as needed for moderate to severe pain 25 tablet 0    metFORMIN (GLUCOPHAGE) 1000 MG tablet          Medications and history reviewed    Physical exam:  Vitals: /79   Pulse 97   Ht 1.981 m (6' 6\")   Wt 114.8 kg (253 lb)   BMI 29.24 kg/m    BMI= Body mass index is 29.24 kg/m .    Constitutional: healthy, alert, and no distress  Head: Normocephalic. No masses, lesions, tenderness or abnormalities  Gastrointestinal: Abdomen soft, non-tender. BS normal. No masses, organomegaly.  No visible or palpable hernia.  Has a diastases rectus most noticeable when sitting up on the exam table.  About 1 finger wide.  : Deferred  Musculoskeletal: extremities normal- no gross deformities noted, gait normal, and normal muscle tone  Skin: no suspicious lesions or rashes  Patient able to get up on table without difficulty.      Assessment:     ICD-10-CM    1. Diastasis recti  M62.08         Plan: No hernia on exam, only this diastases.  Discussed differences in hernia versus diastases.  Provided the [employer] diastases informational handout which has some physical therapy type exercises that he could try on his own.  Discussed that there is high risk of forming hernia in the week area and to keep an eye on things.  Can also help to work on weight loss.  Follow-up in clinic as needed.    Abhi Ponce MD    "

## 2025-03-09 ENCOUNTER — HEALTH MAINTENANCE LETTER (OUTPATIENT)
Age: 55
End: 2025-03-09

## 2025-06-28 ENCOUNTER — HEALTH MAINTENANCE LETTER (OUTPATIENT)
Age: 55
End: 2025-06-28

## (undated) DEVICE — SU VICRYL 3-0 RB-1 27" UND J215H

## (undated) DEVICE — NDL 25GA 1.5" 305127

## (undated) DEVICE — SOL WATER IRRIG 1000ML BOTTLE 07139-09

## (undated) DEVICE — NDL 19GA 1.5"

## (undated) DEVICE — ESU PENCIL SMOKE EVAC W/ROCKER SWITCH 0703-047-000

## (undated) DEVICE — GLOVE PROTEXIS W/NEU-THERA 8.5  2D73TE85

## (undated) DEVICE — PACK EXTREMITY SOP15EXFSD

## (undated) DEVICE — Device

## (undated) DEVICE — SU ETHILON 4-0 FS-2 18" 662H

## (undated) DEVICE — PIN GUARD 10-1-001 101001PBX

## (undated) DEVICE — DRSG KERLIX 4 1/2"X4YDS ROLL 6715

## (undated) DEVICE — BLADE SAW OSCILLATING STRYK MED 9.0X25X0.38MM 2296-003-111

## (undated) DEVICE — BNDG KLING 3" 2232

## (undated) DEVICE — DRSG GAUZE 4X4" TRAY 6939

## (undated) DEVICE — SYR 10ML LL W/O NDL

## (undated) DEVICE — SU VICRYL 4-0 PS-2 18" UND J496H

## (undated) DEVICE — DRSG STERI STRIP 1/4X3" R1541

## (undated) DEVICE — ESU GROUND PAD ADULT W/CORD E7507

## (undated) DEVICE — PREP CHLORAPREP 26ML TINTED ORANGE  260815

## (undated) DEVICE — DRAPE C-ARM MINI 5423

## (undated) DEVICE — DRAPE STERI TOWEL SM 1000

## (undated) DEVICE — GOWN IMPERVIOUS BREATHABLE 2XL/XLONG

## (undated) RX ORDER — CEFAZOLIN SODIUM 1 G/3ML
INJECTION, POWDER, FOR SOLUTION INTRAMUSCULAR; INTRAVENOUS
Status: DISPENSED
Start: 2022-04-05

## (undated) RX ORDER — FENTANYL CITRATE 50 UG/ML
INJECTION, SOLUTION INTRAMUSCULAR; INTRAVENOUS
Status: DISPENSED
Start: 2022-04-05

## (undated) RX ORDER — ACETAMINOPHEN 325 MG/1
TABLET ORAL
Status: DISPENSED
Start: 2022-04-05

## (undated) RX ORDER — LIDOCAINE HYDROCHLORIDE 20 MG/ML
INJECTION, SOLUTION INFILTRATION; PERINEURAL
Status: DISPENSED
Start: 2022-04-05

## (undated) RX ORDER — PROPOFOL 10 MG/ML
INJECTION, EMULSION INTRAVENOUS
Status: DISPENSED
Start: 2022-04-05